# Patient Record
Sex: FEMALE | NOT HISPANIC OR LATINO | URBAN - NONMETROPOLITAN AREA
[De-identification: names, ages, dates, MRNs, and addresses within clinical notes are randomized per-mention and may not be internally consistent; named-entity substitution may affect disease eponyms.]

---

## 2018-02-23 ENCOUNTER — OFFICE VISIT CONVERTED (OUTPATIENT)
Dept: FAMILY MEDICINE CLINIC | Age: 73
End: 2018-02-23
Attending: FAMILY MEDICINE

## 2018-04-03 ENCOUNTER — OFFICE VISIT CONVERTED (OUTPATIENT)
Dept: FAMILY MEDICINE CLINIC | Age: 73
End: 2018-04-03
Attending: FAMILY MEDICINE

## 2018-09-07 ENCOUNTER — OFFICE VISIT CONVERTED (OUTPATIENT)
Dept: FAMILY MEDICINE CLINIC | Age: 73
End: 2018-09-07
Attending: NURSE PRACTITIONER

## 2018-09-07 ENCOUNTER — CONVERSION ENCOUNTER (OUTPATIENT)
Dept: OTHER | Facility: HOSPITAL | Age: 73
End: 2018-09-07

## 2018-09-14 ENCOUNTER — OFFICE VISIT CONVERTED (OUTPATIENT)
Dept: FAMILY MEDICINE CLINIC | Age: 73
End: 2018-09-14
Attending: NURSE PRACTITIONER

## 2018-11-02 ENCOUNTER — OFFICE VISIT CONVERTED (OUTPATIENT)
Dept: FAMILY MEDICINE CLINIC | Age: 73
End: 2018-11-02
Attending: NURSE PRACTITIONER

## 2019-01-23 ENCOUNTER — OFFICE VISIT CONVERTED (OUTPATIENT)
Dept: FAMILY MEDICINE CLINIC | Age: 74
End: 2019-01-23
Attending: NURSE PRACTITIONER

## 2019-08-26 ENCOUNTER — HOSPITAL ENCOUNTER (OUTPATIENT)
Dept: OTHER | Facility: HOSPITAL | Age: 74
Discharge: HOME | End: 2019-08-26
Attending: NURSE PRACTITIONER

## 2019-08-26 ENCOUNTER — OFFICE VISIT CONVERTED (OUTPATIENT)
Dept: FAMILY MEDICINE CLINIC | Age: 74
End: 2019-08-26
Attending: NURSE PRACTITIONER

## 2019-10-07 ENCOUNTER — HOSPITAL ENCOUNTER (OUTPATIENT)
Dept: OTHER | Facility: HOSPITAL | Age: 74
Discharge: HOME | End: 2019-10-07
Attending: FAMILY MEDICINE

## 2019-10-07 ENCOUNTER — OFFICE VISIT CONVERTED (OUTPATIENT)
Dept: FAMILY MEDICINE CLINIC | Age: 74
End: 2019-10-07
Attending: FAMILY MEDICINE

## 2019-10-07 LAB
ALBUMIN SERPL-MCNC: 4.2 G/DL (ref 3.5–5)
ALBUMIN/GLOB SERPL: 1.3 {RATIO} (ref 1.4–2.6)
ALP SERPL-CCNC: 61 U/L (ref 43–160)
ALT SERPL-CCNC: 17 U/L (ref 10–40)
ANION GAP SERPL CALC-SCNC: 21 MMOL/L (ref 8–19)
AST SERPL-CCNC: 32 U/L (ref 15–50)
BILIRUB SERPL-MCNC: 0.26 MG/DL (ref 0.2–1.3)
BUN SERPL-MCNC: 9 MG/DL (ref 5–25)
BUN/CREAT SERPL: 13 {RATIO} (ref 6–20)
CALCIUM SERPL-MCNC: 9.6 MG/DL (ref 8.7–10.4)
CHLORIDE SERPL-SCNC: 98 MMOL/L (ref 99–111)
CHOLEST SERPL-MCNC: 180 MG/DL (ref 107–200)
CHOLEST/HDLC SERPL: 3.4 {RATIO} (ref 3–6)
CONV CO2: 23 MMOL/L (ref 22–32)
CONV TOTAL PROTEIN: 7.4 G/DL (ref 6.3–8.2)
CREAT UR-MCNC: 0.68 MG/DL (ref 0.5–0.9)
GFR SERPLBLD BASED ON 1.73 SQ M-ARVRAT: >60 ML/MIN/{1.73_M2}
GLOBULIN UR ELPH-MCNC: 3.2 G/DL (ref 2–3.5)
GLUCOSE SERPL-MCNC: 103 MG/DL (ref 65–99)
HDLC SERPL-MCNC: 53 MG/DL (ref 40–60)
LDLC SERPL CALC-MCNC: 108 MG/DL (ref 70–100)
OSMOLALITY SERPL CALC.SUM OF ELEC: 285 MOSM/KG (ref 273–304)
POTASSIUM SERPL-SCNC: 4.1 MMOL/L (ref 3.5–5.3)
SODIUM SERPL-SCNC: 138 MMOL/L (ref 135–147)
TRIGL SERPL-MCNC: 93 MG/DL (ref 40–150)
VLDLC SERPL-MCNC: 19 MG/DL (ref 5–37)

## 2019-10-09 ENCOUNTER — HOSPITAL ENCOUNTER (OUTPATIENT)
Dept: OTHER | Facility: HOSPITAL | Age: 74
Discharge: HOME | End: 2019-10-09
Attending: FAMILY MEDICINE

## 2020-06-03 ENCOUNTER — OFFICE VISIT CONVERTED (OUTPATIENT)
Dept: FAMILY MEDICINE CLINIC | Age: 75
End: 2020-06-03
Attending: FAMILY MEDICINE

## 2020-06-03 ENCOUNTER — HOSPITAL ENCOUNTER (OUTPATIENT)
Dept: OTHER | Facility: HOSPITAL | Age: 75
Discharge: HOME | End: 2020-06-03
Attending: FAMILY MEDICINE

## 2020-06-03 LAB
ALBUMIN SERPL-MCNC: 4.4 G/DL (ref 3.5–5)
ALBUMIN/GLOB SERPL: 1.5 {RATIO} (ref 1.4–2.6)
ALP SERPL-CCNC: 54 U/L (ref 43–160)
ALT SERPL-CCNC: 16 U/L (ref 10–40)
ANION GAP SERPL CALC-SCNC: 20 MMOL/L (ref 8–19)
AST SERPL-CCNC: 27 U/L (ref 15–50)
BASOPHILS # BLD MANUAL: 0.05 10*3/UL (ref 0–0.2)
BASOPHILS NFR BLD MANUAL: 0.8 % (ref 0–3)
BILIRUB SERPL-MCNC: 0.33 MG/DL (ref 0.2–1.3)
BUN SERPL-MCNC: 16 MG/DL (ref 5–25)
BUN/CREAT SERPL: 24 {RATIO} (ref 6–20)
CALCIUM SERPL-MCNC: 9.8 MG/DL (ref 8.7–10.4)
CHLORIDE SERPL-SCNC: 105 MMOL/L (ref 99–111)
CONV CO2: 21 MMOL/L (ref 22–32)
CONV TOTAL PROTEIN: 7.3 G/DL (ref 6.3–8.2)
CREAT UR-MCNC: 0.67 MG/DL (ref 0.5–0.9)
DEPRECATED RDW RBC AUTO: 46.7 FL
EOSINOPHIL # BLD MANUAL: 0.28 10*3/UL (ref 0–0.7)
EOSINOPHIL NFR BLD MANUAL: 4.3 % (ref 0–7)
ERYTHROCYTE [DISTWIDTH] IN BLOOD BY AUTOMATED COUNT: 12.9 % (ref 11.5–14.5)
GFR SERPLBLD BASED ON 1.73 SQ M-ARVRAT: >60 ML/MIN/{1.73_M2}
GLOBULIN UR ELPH-MCNC: 2.9 G/DL (ref 2–3.5)
GLUCOSE SERPL-MCNC: 90 MG/DL (ref 65–99)
GRANS (ABSOLUTE): 3.75 10*3/UL (ref 2–8)
GRANS: 58.1 % (ref 30–85)
HBA1C MFR BLD: 12.3 G/DL (ref 12–16)
HCT VFR BLD AUTO: 37.6 % (ref 37–47)
IMM GRANULOCYTES # BLD: 0.01 10*3/UL (ref 0–0.54)
IMM GRANULOCYTES NFR BLD: 0.2 % (ref 0–0.43)
LIPASE SERPL-CCNC: 63 U/L (ref 5–51)
LYMPHOCYTES # BLD MANUAL: 1.87 10*3/UL (ref 1–5)
LYMPHOCYTES NFR BLD MANUAL: 7.7 % (ref 3–10)
MCH RBC QN AUTO: 31.7 PG (ref 27–31)
MCHC RBC AUTO-ENTMCNC: 32.7 G/DL (ref 33–37)
MCV RBC AUTO: 96.9 FL (ref 81–99)
MONOCYTES # BLD AUTO: 0.5 10*3/UL (ref 0.2–1.2)
OSMOLALITY SERPL CALC.SUM OF ELEC: 295 MOSM/KG (ref 273–304)
PLATELET # BLD AUTO: 282 10*3/UL (ref 130–400)
PMV BLD AUTO: 9.7 FL (ref 7.4–10.4)
POTASSIUM SERPL-SCNC: 4.3 MMOL/L (ref 3.5–5.3)
RBC # BLD AUTO: 3.88 10*6/UL (ref 4.2–5.4)
SODIUM SERPL-SCNC: 142 MMOL/L (ref 135–147)
VARIANT LYMPHS NFR BLD MANUAL: 28.9 % (ref 20–45)
WBC # BLD AUTO: 6.46 10*3/UL (ref 4.8–10.8)

## 2020-06-12 ENCOUNTER — OFFICE VISIT CONVERTED (OUTPATIENT)
Dept: FAMILY MEDICINE CLINIC | Age: 75
End: 2020-06-12
Attending: FAMILY MEDICINE

## 2020-06-25 ENCOUNTER — OFFICE VISIT CONVERTED (OUTPATIENT)
Dept: FAMILY MEDICINE CLINIC | Age: 75
End: 2020-06-25
Attending: FAMILY MEDICINE

## 2021-05-18 NOTE — PROGRESS NOTES
"Jelena Hawkins  1945     Office/Outpatient Visit    Visit Date: Wed, Darryn 3, 2020 10:19 am    Provider: Ambika Alicea MD (Assistant: Era Brownlee MA)    Location: [facility]        Electronically signed by Ambika Alicea MD on  06/03/2020 10:59:34 AM                             Subjective:        CC: Phoenix is a 74 year old White female.  Patient presents today to discuss GI issues X 6 weeks;         HPI: Phoenix is here today with chief complaint of stomach upset for the past 6 weeks.  She complains of pain in the epigastrium.  She has been trying Gas-Ex OTC.  That does help but she still gets a terrible amount of gas with anything she eats.  Pain starts in the epigastrium and then radiates \"out\" and straight through to the back.  Pain in the epigastrium is constant.  Eating does seem to worsen the pain.  She has been trying to eat small meals because of this.  No acid reflux as far as she can tell.  No diarrhea, sometimes having constipation.  No nausea or vomiting.  No melena or hematochezia.  Change in the stool caliber (smaller).    ROS:     CONSTITUTIONAL:  Negative for fatigue and fever.      EYES:  Negative for blurred vision.      CARDIOVASCULAR:  Negative for chest pain and palpitations.      RESPIRATORY:  Negative for recent cough and dyspnea.      GASTROINTESTINAL:  Positive for abdominal pain, abdominal bloating, constipation and change in stool caliber.   Negative for acid reflux symptoms, dysphagia, diarrhea, heartburn, hematochezia, melena, nausea or vomiting.      MUSCULOSKELETAL:  Negative for arthralgias and myalgias.          Past Medical History / Family History / Social History:         Last Reviewed on 6/03/2020 10:43 AM by Ambika Alicea    Past Medical History:             PREVENTIVE HEALTH MAINTENANCE             BONE DENSITY: was last done 10/9/2019 with the following abnormality noted-- osteopenia improved; on Evista     COLORECTAL CANCER SCREENING: Up to date " (colonoscopy q10y; sigmoidoscopy q5y; Cologuard q3y) was last done 10/15/2014, Results are in chart; colonoscopy with normal results     MAMMOGRAM: Done within last 2 years and results in are chart was last done 10/9/2019 with normal results h/o fibrocystic breast disease does not want anymore         PAST MEDICAL HISTORY         Positive for    Hypertension;     Positive for    Osteoarthritis;         CURRENT MEDICAL PROVIDERS:    Ophthalmologist: Dr. Cedeno    Orthopedist: Dr. 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          Substance Abuse History:     Last Reviewed on 6/03/2020 10:43 AM by Ambika Alicea        Mental Health History:     Last Reviewed on 6/03/2020 10:43 AM by Ambika Alicea        Communicable Diseases (eg STDs):     Last Reviewed on 6/03/2020 10:43 AM by Ambika Alicea        Current Problems:     Last Reviewed on 10/07/2019 02:35 PM by Ambika Alicea    Osteoporosis, other    Essential (primary) hypertension    Other osteoporosis without current pathological fracture    Hypertension    Lower back pain    Other symptoms and signs involving the musculoskeletal system    Strain of muscle, fascia and tendon of lower back, subsequent encounter    Shoulder crepitus        Immunizations:     Fluzone vs. High Dose Fluzone 10/1/2016    Td adult 10/20/2018    Prevnar 13 (Pneumococcal PCV 13) 1/27/2016    Fluzone High-Dose pf (>=65 yr) 12/1/2017    Fluzone High-Dose pf (>=65 yr) 10/7/2019    PNEUMOVAX 23 (Pneumococcal  PPV23) 1/23/2019        Allergies:     Last Reviewed on 10/07/2019 02:35 PM by Ambika Alicea    No Known Allergies.        Current Medications:     Last Reviewed on 10/07/2019 02:35 PM by Ambika Alicea    Amlodipine  5 mg oral tablet [1 tab daily]    raloxifene 60 mg oral tablet [1 tab daily]    Valsartan 160 mg oral tablet [Take 1 tablet(s) by mouth daily]    Multivitamin/Mineral Supplement         Objective:        Vitals:         Current: 6/3/2020 10:21:55 AM    Ht:  5 ft, 2 in;  Wt: 136.4 lbs;  BMI: 24.9T: 97.8 F (oral);  BP: 124/71 mm Hg (right arm, sitting);  P: 94 bpm (right arm (BP Cuff), sitting);  sCr: 0.68 mg/dL;  GFR: 66.12        Exams:     PHYSICAL EXAM:     GENERAL: vital signs recorded - well developed, well nourished;  well groomed;  no apparent distress;     EYES: extraocular movements intact; conjunctiva and cornea are normal; PERRLA;     E/N/T: OROPHARYNX:  normal mucosa, dentition, gingiva, and posterior pharynx;     RESPIRATORY: normal respiratory rate and pattern with no distress; normal breath sounds with no rales, rhonchi, wheezes or rubs;     CARDIOVASCULAR: normal rate; rhythm is regular;  no systolic murmur; no edema;     GASTROINTESTINAL: moderate epigastric pain;  voluntary guarding;  no rebound tenderness;  normal bowel sounds;     MUSCULOSKELETAL: normal gait; normal overall tone         Assessment:         R10.816   Epigastric abdominal tenderness       Z13.31   Encounter for screening for depression           ORDERS:         Radiology/Test Orders:       3017F  Colorectal CA screen results documented and reviewed (PV)  (In-House)              Lab Orders:       [id number]  BDCBC - [facility] CBC with 3 part diff  (Send-Out)            54808  COMP - [facility] Comp. Metabolic Panel  (Send-Out)            41162  HPUBT - [facility] H.pylori Breath test  (Send-Out)            95996  LIP - [facility] Lipase, Serum  (Send-Out)              Other Orders:       1100F  Pt screen for fall risk; document 2+ falls in the  "past yr or any fall w/injury in past year (ISABEL)  (In-House)              Screening mammogram results documented  (Send-Out)              Depression screen negative  (In-House)                      Plan:         Epigastric abdominal tendernessModerate epigastric TTP with increased belching and gas.  Possible H. pylori vs other gastritis or possible gallbladder pathology.  Checking labs as below, also to include lipase to evaluate pancreas.  Will contact her with results when these are available.    LABORATORY:  Labs ordered to be performed today include CBC, Comprehensive metabolic panel, H.pylori urea breath test ([facility]), and Lipase.  [facility] PHQ-9 Depression Screening: Completed form scanned and in chart; Total Score 11; Positive Depression Screen but after further evaluation the patient does not have a diagnosis of depression.  \"I'm not really depressed.  It's just this coronavirus.\"    FOLLOW-UP: Keep currently scheduled appointment.:.  f/u AWV in Oct          Orders:       01645  BDCBC - [facility] CBC with 3 part diff  (Send-Out)            60518  COMP - [facility] Comp. Metabolic Panel  (Send-Out)            13195  HPUBT - [facility] H.pylori Breath test  (Send-Out)            86418  LIP - [facility] Lipase, Serum  (Send-Out)              Depression screen negative  (In-House)              Encounter for screening for depression    MIPS Has fallen 2+ times in the past year or had one fall with an injury in the past year Vaccines Flu and Pneumonia updated in Shot record Screening mammomgram done within last 2 years and results in are chart Colorectal Cancer Screening is up to date and the results are in the chart           Orders:       1100F  Pt screen for fall risk; document 2+ falls in the past yr or any fall w/injury in past year (ISABEL)  (In-House)              Screening mammogram results documented  (Send-Out)            3017F  Colorectal CA screen results documented and reviewed (PV)  (In-House)                  Patient " Recommendations:        For  Epigastric abdominal tenderness:                    APPOINTMENT INFORMATION:        Monday Tuesday Wednesday Thursday Friday Saturday Sunday            Time:___________________AM  PM   Date:_____________________             Charge Capture:         Primary Diagnosis:     R10.816  Epigastric abdominal tenderness           Orders:      86046  Office/outpatient visit; established patient, level 3  (In-House)              Depression screen negative  (In-House)              Z13.31  Encounter for screening for depression           Orders:      1100F  Pt screen for fall risk; document 2+ falls in the past yr or any fall w/injury in past year (ISABEL)  (In-House)            3017F  Colorectal CA screen results documented and reviewed (PV)  (In-House)

## 2021-05-18 NOTE — PROGRESS NOTES
Jelena Hawkins 1945     Office/Outpatient Visit    Visit Date: Mon, Aug 26, 2019 01:24 pm    Provider: Kiya Oliva N.P. (Assistant: Sarah Spurling, MA)    Location: [facility]        Electronically signed by Kiya Oilva N.P. on  08/26/2019 02:35:34 PM                             SUBJECTIVE:        CC:     Phoenix is a 73 year old White female.  Sciatica;         HPI:         Patient to be evaluated for lower back pain.  The location is primarily in the lower, right lumbar spine.  The pain radiates to the right calf.  She characterizes it as intermittent.  This is a chronic, but intermittent problem with an acute exacerbation.  She states that the current episode of pain started one month ago.  The event which precipitated this pain was was in the 5K for [employer] in June, has been flared up ever since.          In regard to the foot pain, today's visit is for evaluation of the right foot.  The location of the discomfort is primarily the dorsal surface.  It radiates to the arch and toes.  The pain initially began several months ago.  The precipitating event was but she did catcher her great to on the door she was opening and ripped her toenail off.  She characterizes the pain as intermittent, aching, and burning.  Associated symptoms include sligh swelling over dorsal right foot.  The pain increases with at the end of the day.      ROS:     CONSTITUTIONAL:  Negative for chills, fatigue, fever, and weight change.      CARDIOVASCULAR:  Negative for chest pain, orthopnea, paroxysmal nocturnal dyspnea and pedal edema.      RESPIRATORY:  Negative for dyspnea.      GENITOURINARY:  Negative for dysuria and frequent urination.      MUSCULOSKELETAL:  Positive for arthralgias, (right foot) back pain ( recurrent ) and limb pain ( right leg pain ).      NEUROLOGICAL:  Negative for paresthesias.          PMH/FMH/SH:     Last Reviewed on 9/07/2018 11:23 AM by Kiya Oliva    Past Medical History:              PREVENTIVE HEALTH MAINTENANCE             BONE DENSITY: was last done 4/2016 with the following abnormality noted-- osteopenia     COLORECTAL CANCER SCREENING: colonoscopy with normal results     MAMMOGRAM: was last done 5/5/17 fibrocystic breast disease does not want anymore         PAST MEDICAL HISTORY         Positive for    Hypertension;     Positive for    Osteoarthritis;         CURRENT MEDICAL PROVIDERS:    Ophthalmologist: Dr. Cedeno    Orthopedist: Dr. 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          Substance Abuse History:     Last Reviewed on 4/03/2018 03:51 PM by Ambika Alicea        Mental Health History:     Last Reviewed on 4/03/2018 03:51 PM by Ambika Alicea        Communicable Diseases (eg STDs):     Last Reviewed on 4/03/2018 03:51 PM by Ambika Alicea            Current Problems:     Last Reviewed on 11/02/2018 02:14 PM by Kiya Oliva    Lower back pain     Osteoporosis, other     Hypertension     Foot pain     Shoulder crepitus         Immunizations:     Fluzone vs. High Dose Fluzone 10/1/2016     Td adult 10/20/2018     Prevnar 13 (Pneumococcal PCV 13) 1/27/2016     Fluzone High-Dose pf (>=65 yr) 12/1/2017     PNEUMOVAX 23 (Pneumococcal PPV23) 1/23/2019         Allergies:     Last Reviewed on 8/26/2019 01:28 PM by Spurling, Sarah C      No Known Drug Allergies.         Current Medications:     Last Reviewed on 8/26/2019 01:29 PM by Spurling, Sarah C    Raloxifene 60mg Tablet 1 tab daily      Amlodipine  5mg Tablet 1 tab daily     Valsartan 160mg Tablet Take 1 tablet(s) by mouth daily         OBJECTIVE:        Vitals:         Current: 8/26/2019 1:30:45 PM    Ht:  5 ft, 2 in;  Wt: 162.2 lbs;  BMI: 29.7    T: 98.1 F (oral);  BP: 126/64 mm Hg (right arm, sitting);  P: 74 bpm (right arm (BP Cuff), sitting);  sCr: 0.82 mg/dL;  GFR: 59.91        Exams:     PHYSICAL EXAM:     GENERAL: vital signs recorded - well developed, well nourished;  no apparent distress;     NECK: range of motion is normal; thyroid is non-palpable;     RESPIRATORY: normal respiratory rate and pattern with no distress; normal breath sounds with no rales, rhonchi, wheezes or rubs;     CARDIOVASCULAR: normal rate; rhythm is regular;  no systolic murmur; no edema;     MUSCULOSKELETAL: gait: affected by a right leg limp;  normal range of motion of all major muscle groups; pain with range of motion in: back extension;  right ankle extension and plantar flexion;     NEUROLOGICAL:  cranial nerves, motor and sensory function, reflexes, gait and coordination are all intact;     PSYCHIATRIC:  appropriate affect and demeanor; normal speech pattern; grossly normal memory;         ASSESSMENT:           724.2   S39.012D  Lower back pain              DDx:     729.5   M79.671  Foot pain              DDx:         ORDERS:         Radiology/Test Orders:       [id number]  Radiologic examination, spine, lumbosacral;  minimum of four views  (Send-Out)         97346XA  Right radiologic examination, foot; complete, minimum of three views  (Send-Out)                   PLAN:          Lower back pain will get xray - she stated that her chiropractor would like for her to have this done and let him know if acute finding.  She prema resume the diclofenac at home and follow up here if appropriat - she is also scheduled for a therapeutic massage soon         RADIOLOGY:  I have ordered Lumbar/Sacral Spine X-ray to be done today.            Orders:       [id number]  Radiologic  examination, spine, lumbosacral;  minimum of four views  (Send-Out)            Foot pain may be related to her toenail issue, but will xray and have her follow pPRN if no improvement after NSAIDs and rest         RADIOLOGY:  I have ordered a right foot x-ray to be done today.            Orders:       [id number]  Right radiologic examination, foot; complete, minimum of three views  (Send-Out)               CHARGE CAPTURE:           Primary Diagnosis:     724.2 Lower back pain            S39.012D    Strain of muscle, fascia and tendon of lower back, subsequent encounter              Orders:          64057   Office/outpatient visit; established patient, level 3  (In-House)           729.5 Foot pain            M79.671    Pain in right foot

## 2021-05-18 NOTE — PROGRESS NOTES
Jelena Hawkins 1945     Office/Outpatient Visit    Visit Date: Fri, Nov 2, 2018 01:18 pm    Provider: Kiya Oliva N.P. (Assistant: Sarah Spurling, MA)    Location: [facility]        Electronically signed by Kiya Oliva N.P. on  11/02/2018 02:17:09 PM                             SUBJECTIVE:        CC:     Phoenix is a 72 year old White female.  Left ring finger, she cut it.;         HPI:         Finger laceration noted.  Phoenix presents with a laceration of the left 4th finger.  Evidence of possible infection at the site include increased pain (other than expected from the injury), but not redness, drainage, injury site warmth or fever.  The injury is due to a feel and broke plastic flower pot broke and she cut her finger on the plastic.  This occurred 2 weeks ago.  She has already bandaged the area and went to urgent care, antibiotic- tetanus shot -.  I here for follow up to have her finger checked out     ROS:     CONSTITUTIONAL:  Negative for chills, fatigue and fever.      CARDIOVASCULAR:  Negative for chest pain and pedal edema.      RESPIRATORY:  Negative for recent cough and dyspnea.      INTEGUMENTARY/BREAST:  Positive for laceration to left 4th finger.   Negative for pruritis or rash.      NEUROLOGICAL:  Negative for paresthesias.          PM/FM/SH:     Last Reviewed on 9/07/2018 11:23 AM by Kiya Oliva    Past Medical History:             PREVENTIVE HEALTH MAINTENANCE             BONE DENSITY: was last done 4/2016 with the following abnormality noted-- osteopenia     COLONOSCOPY: with normal results     INFLUENZA VACCINE: was last done DECLINES     MAMMOGRAM: was last done 5/5/17 fibrocystic breast disease does not want anymore     Prevnar 13: was last done 1/2016     PNEUMOCOCCAL 23 VACCINE: was last done 2010         PAST MEDICAL HISTORY         Positive for    Hypertension;     Positive for    Osteoarthritis;         CURRENT MEDICAL PROVIDERS:    Ophthalmologist:   Pao    Orthopedist: Dr. 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          Substance Abuse History:     Last Reviewed on 4/03/2018 03:51 PM by Ambika Alicea        Mental Health History:     Last Reviewed on 4/03/2018 03:51 PM by Ambika Alicea        Communicable Diseases (eg STDs):     Last Reviewed on 4/03/2018 03:51 PM by Ambika Alicea            Current Problems:     Last Reviewed on 11/02/2018 02:14 PM by Kiya Oliva    Lower back pain     Osteoporosis, other     Hypertension     Finger laceration     Screening mammogram - other     Shoulder crepitus         Immunizations:     Fluzone vs. High Dose Fluzone 10/1/2016     Td adult 10/20/2018     Prevnar 13 (Pneumococcal PCV 13) 1/27/2016     Fluzone High-Dose pf (>=65 yr) 12/1/2017         Allergies:     Last Reviewed on 11/02/2018 01:18 PM by Spurling, Sarah C      No Known Drug Allergies.         Current Medications:     Last Reviewed on 11/02/2018 01:21 PM by Spurling, Sarah C    Amlodipine  5mg Tablet 1 tab daily     Losartan 100mg Tablet Take 1 tablet(s) by mouth daily     Raloxifene 60mg Tablet 1 tab daily     Diclofenac Sodium 75mg Tablets, Enteric Coated 1 tab bid with food         OBJECTIVE:        Vitals:         Current: 11/2/2018 1:22:39 PM    Ht:  5 ft, 2 in;  Wt: 158.2 lbs;  BMI: 28.9    T: 98.3 F (oral);  BP: 152/76 mm Hg (right arm, sitting);  P: 86 bpm (right arm (BP Cuff), sitting);  sCr: 0.82 mg/dL;  GFR: 60.15        Exams:     PHYSICAL EXAM:      GENERAL: vital signs recorded - well developed, well nourished;  no apparent distress;     NECK: range of motion is normal;     RESPIRATORY: normal appearance and symmetric expansion of chest wall; normal respiratory rate and pattern with no distress; normal breath sounds with no rales, rhonchi, wheezes or rubs;     CARDIOVASCULAR: normal rate; rhythm is regular;  no edema;     LYMPHATIC: no enlargement of cervical or facial nodes; no supraclavicular nodes;     BREAST/INTEGUMENT: healing area to the left 4th finger  lateral upper digit;  some areas noted with minimal slough - ;  no redness, no warmth, no active drainage;     MUSCULOSKELETAL: normal gait; normal range of motion of all major muscle groups; no limb or joint pain with range of motion;     NEUROLOGIC: mental status: alert and oriented x 3;     PSYCHIATRIC: appropriate affect and demeanor; normal speech pattern; normal thought and perception;         ASSESSMENT           883.0   S61.215D  Finger laceration              DDx:         PLAN:          Finger laceration cleaned area today with saline and applied Triple antibiotic ointment         RECOMMENDATIONS given include: recommend leaving area open to air much of the time when at home - protect when possibility of contamination/ further injury.            Patient Education Handouts:       [facility] Medication Compliance              Patient Recommendations:        For  Finger laceration:     I also recommend recommend leaving area open to air much of the time when at home - protect when possibility of contamination/ further injury.              CHARGE CAPTURE           **Please note: ICD descriptions below are intended for billing purposes only and may not represent clinical diagnoses**        Primary Diagnosis:         883.0 Finger laceration            S61.215D    Laceration without foreign body of left ring finger without damage to nail, subsequent encounter              Orders:          [id number]    Office/outpatient visit; established patient, level 3  (In-House)

## 2021-05-18 NOTE — PROGRESS NOTES
Jelena Hawkins 1945     Office/Outpatient Visit    Visit Date: Fri, Sep 14, 2018 01:55 pm    Provider: Kiya Oliva N.P. (Assistant: Sarah Spurling, MA)    Location: [facility]        Electronically signed by Kiya Oliva N.P. on  09/16/2018 10:20:24 PM                             SUBJECTIVE:        CC:     Phoenix is a 72 year old White female.  This is a follow-up visit.  Not any better, she is still down in her back, ciatic nerve pain.;         HPI:         Patient to be evaluated for lower back pain.  Reason for visit: Pain.  This is a follow-up visit. Her symptoms are unchanged since last visit.  The discomfort is most prominent in the lumbar spine.  This radiates to the right buttock.  and in bilateral groin She states that the current episode of pain started 2 weeks ago.  The event which precipitated this pain was was getting out of bed and put feet on floor - pulled or strained.  Other details: was put on medrol dose pack last week - reports today no better  did help in the beginning - but now today is worse with the pain radiating down into the groin.      ROS:     CONSTITUTIONAL:  Negative for chills, fatigue and fever.      CARDIOVASCULAR:  Negative for chest pain and pedal edema.      RESPIRATORY:  Negative for recent cough and dyspnea.      MUSCULOSKELETAL:  Positive for back pain.      NEUROLOGICAL:  Negative for paresthesias.          PMH/FMH/SH:     Last Reviewed on 9/07/2018 11:23 AM by Kiya Oliva    Past Medical History:             PREVENTIVE HEALTH MAINTENANCE             BONE DENSITY: was last done 4/2016 with the following abnormality noted-- osteopenia     COLONOSCOPY: with normal results     INFLUENZA VACCINE: was last done DECLINES     MAMMOGRAM: was last done 5/5/17 fibrocystic breast disease does not want anymore     Prevnar 13: was last done 1/2016     PNEUMOCOCCAL 23 VACCINE: was last done 2010         PAST MEDICAL HISTORY         Positive for     Hypertension;     Positive for    Osteoarthritis;         CURRENT MEDICAL PROVIDERS:    Ophthalmologist: Dr. Cedeno    Orthopedist: Dr. 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          Substance Abuse History:     Last Reviewed on 4/03/2018 03:51 PM by Ambika Alicea        Mental Health History:     Last Reviewed on 4/03/2018 03:51 PM by Ambika Alicea        Communicable Diseases (eg STDs):     Last Reviewed on 4/03/2018 03:51 PM by Ambika Alicea            Current Problems:     Last Reviewed on 9/07/2018 11:22 AM by Kiya Oliva    Lower back pain     Osteoporosis, other     Hypertension     Screening mammogram - other     Shoulder crepitus         Immunizations:     Fluzone vs. High Dose Fluzone 10/1/2016     Prevnar 13 (Pneumococcal PCV 13) 1/27/2016     Fluzone High-Dose pf (>=65 yr) 12/1/2017         Allergies:     Last Reviewed on 9/14/2018 01:55 PM by Spurling, Sarah C      No Known Drug Allergies.         Current Medications:     Last Reviewed on 9/14/2018 02:00 PM by Spurling, Sarah C    Amlodipine  5mg Tablet 1 tab daily     Losartan 100mg Tablet Take 1 tablet(s) by mouth daily     Raloxifene 60mg Tablet 1 tab daily         OBJECTIVE:        Vitals:         Current: 9/14/2018 2:02:20 PM    Ht:  5 ft, 2 in;  Wt: 152.4 lbs;  BMI: 27.9    T: 98.22 F (oral);  BP: 112/59 mm Hg (right arm, sitting);  P: 91 bpm (right arm (BP Cuff), sitting);  sCr: 0.82 mg/dL;  GFR: 59.21        Exams:     PHYSICAL EXAM:     GENERAL: vital  signs recorded - well developed, well nourished;  no apparent distress;     NECK: range of motion is normal;     RESPIRATORY: normal appearance and symmetric expansion of chest wall; normal respiratory rate and pattern with no distress; normal breath sounds with no rales, rhonchi, wheezes or rubs;     CARDIOVASCULAR: normal rate; rhythm is regular;  no edema;     LYMPHATIC: no enlargement of cervical or facial nodes; no supraclavicular nodes;     MUSCULOSKELETAL: gait: affected by a limp, slowed, and stooped;  normal range of motion of all major muscle groups; pain with range of motion in: back flexion, extension, and lateral flexion;     NEUROLOGIC: mental status: alert and oriented x 3;     PSYCHIATRIC: appropriate affect and demeanor; normal speech pattern; normal thought and perception;         Procedures:     Lower back pain     1. Toradol 60 mg given IM in the left hip; administered by scs;  lot number [id number]; expires 02/20             ASSESSMENT           724.2   S39.012D  Lower back pain              DDx:         ORDERS:         Meds Prescribed:       Diclofenac Sodium 75mg Tablets, Enteric Coated 1 tab bid with food  #30 (Thirty) tablet(s) Refills: 2       Tizanidine HCl 2mg Tablet 1 tab PO daily prn back pain  #20 (Twenty) tablet(s) Refills: 0         Lab Orders:       [id number]  Urinalysis, automated, without microscopy  (In-House)           Procedures Ordered:       REFER  Referral to Specialist or Other Facility  (Send-Out)           Other Orders:       [id number]  Therapeutic injection  (In-House)           Toradol, per 15 mg (x4)                 PLAN:          Lower back pain     LABORATORY:  Labs ordered to be performed today include UA dip in office no micro.      REFERRALS:  Referral initiated to physical therapy ( [facility] ).  Narcotic or pain medication Toradol 60 mg           Prescriptions:       Diclofenac Sodium 75mg Tablets, Enteric Coated 1 tab bid with food  #30  (Thirty) tablet(s) Refills: 2       Tizanidine HCl 2mg Tablet 1 tab PO daily prn back pain  #20 (Twenty) tablet(s) Refills: 0           Orders:       [id number]  Urinalysis, automated, without microscopy  (In-House)         10122  Therapeutic injection  (In-House)                     Toradol, per 15 mg (x4)       REFER  Referral to Specialist or Other Facility  (Send-Out)               CHARGE CAPTURE           **Please note: ICD descriptions below are intended for billing purposes only and may not represent clinical diagnoses**        Primary Diagnosis:         724.2 Lower back pain            S39.012D    Strain of muscle, fascia and tendon of lower back, subsequent encounter              Orders:          [id number]   Office/outpatient visit; established patient, level 3  (In-House)             72837   Urinalysis, automated, without microscopy  (In-House)             97459   Therapeutic injection  (In-House)                                           Toradol, per 15 mg (x4)

## 2021-05-18 NOTE — PROGRESS NOTES
Jelena Hawkins 1945     Office/Outpatient Visit    Visit Date: Fri, Sep 7, 2018 11:05 am    Provider: Kiya Oliva N.P. (Assistant: Sarah Spurling, MA)    Location: [facility]        Electronically signed by Kiya Oliva N.P. on  09/11/2018 10:46:47 AM                             SUBJECTIVE:        CC:     Phoenix is a 72 year old White female.  Pinched nerve, right disk, radiating pain down leg.;         HPI:     Went to Chiropractor today and was told there was something going on L4/L5 - was 'using a machine on back' and when hit that area, twinge in her back and record     Lower back pain noted.  Reason for visit: Pain.  The discomfort is most prominent in the lumbar spine.  She characterizes it as moderate in intensity.  The pain level between 1 and 10 is a 3.  This is an acute episode with no prior history of back pain.  She states that the current episode of pain started one week ago.  The event which precipitated this pain was woke up and ws putting her foot to the floor and sent an excruciating pain down right leg.  This occurred at home.  She has not found anything that helps relieve the pain.      ROS:     CONSTITUTIONAL:  Negative for chills, fatigue and fever.      CARDIOVASCULAR:  Negative for chest pain and pedal edema.      RESPIRATORY:  Negative for recent cough and dyspnea.      GENITOURINARY:  Negative for dysuria, urinary incontinence and change in urine stream.      MUSCULOSKELETAL:  Positive for arthralgias, back pain and myalgias.      NEUROLOGICAL:  Negative for paresthesias.          PMH/FMH/SH:     Last Reviewed on 9/07/2018 11:23 AM by Kiya Oliva    Past Medical History:             PREVENTIVE HEALTH MAINTENANCE             BONE DENSITY: was last done 4/2016 with the following abnormality noted-- osteopenia     COLONOSCOPY: with normal results     INFLUENZA VACCINE: was last done DECLINES     MAMMOGRAM: was last done 5/5/17 fibrocystic breast disease does  not want anymore     Prevnar 13: was last done 1/2016     PNEUMOCOCCAL 23 VACCINE: was last done 2010         PAST MEDICAL HISTORY         Positive for    Hypertension;     Positive for    Osteoarthritis;         CURRENT MEDICAL PROVIDERS:    Ophthalmologist: Dr. Cedeno    Orthopedist: Dr. Blount         Surgical History:         Cataract Removal: bilateral;     Hysterectomy: remotely; d/t fibroid;     Joint Replacement: partial L knee - 8/2014;      ORIF L humerus repair;         Family History:         Sister(s): Breast Cancer     Maternal Grandmother: Type 2 Diabetes         Social History:     Occupation:    Retired     Marital Status: associate (former Sister of Sondra)         Tobacco/Alcohol/Supplements:     Last Reviewed on 9/07/2018 11:10 AM by Spurling, Sarah C    Tobacco: She has never smoked.          Substance Abuse History:     Last Reviewed on 4/03/2018 03:51 PM by Ambika Alicea        Mental Health History:     Last Reviewed on 4/03/2018 03:51 PM by Ambika Alicea        Communicable Diseases (eg STDs):     Last Reviewed on 4/03/2018 03:51 PM by Ambika Alicea            Current Problems:     Last Reviewed on 9/07/2018 11:22 AM by Kiya Oliva    Lower back pain     Osteoporosis, other     Hypertension     Screening mammogram - other     Nonspecific abnormality on liver function study     Shoulder crepitus         Immunizations:     Fluzone vs. High Dose Fluzone 10/1/2016     Prevnar 13 (Pneumococcal PCV 13) 1/27/2016     Fluzone High-Dose pf (>=65 yr) 12/1/2017         Allergies:     Last Reviewed on 9/07/2018 11:10 AM by Spurling, Sarah C      No Known Drug Allergies.         Current Medications:     Last Reviewed on 9/07/2018 11:11 AM by Spurling, Sarah C    Amlodipine  5mg Tablet 1 tab daily     Losartan 100mg Tablet Take 1 tablet(s) by mouth daily     Raloxifene 60mg Tablet 1 tab daily         OBJECTIVE:        Vitals:         Current: 9/7/2018 11:12:41 AM    Ht:  5 ft, 2 in;  Wt:  152.2 lbs;  BMI: 27.8    T: 98.5 F (oral);  BP: 118/81 mm Hg (right arm, sitting);  P: 74 bpm (right arm (BP Cuff), sitting);  sCr: 0.82 mg/dL;  GFR: 59.17        Exams:     PHYSICAL EXAM:     GENERAL: vital signs recorded - well developed, well nourished;  no apparent distress;     NECK: range of motion is normal;     RESPIRATORY: normal appearance and symmetric expansion of chest wall; normal respiratory rate and pattern with no distress; normal breath sounds with no rales, rhonchi, wheezes or rubs;     CARDIOVASCULAR: normal rate; rhythm is regular;  no edema;     LYMPHATIC: no enlargement of cervical or facial nodes; no supraclavicular nodes;     MUSCULOSKELETAL: normal gait; normal range of motion of all major muscle groups; pain with range of motion in: back extension and lateral flexion;     NEUROLOGIC: mental status: alert and oriented x 3;     PSYCHIATRIC: appropriate affect and demeanor; normal speech pattern; normal thought and perception;         ASSESSMENT           724.2   S39.012A  Lower back pain              DDx:     794.8   R94.5  Nonspecific abnormality on liver function study              DDx:     V76.12   Z12.31  Screening mammogram - other              DDx:         ORDERS:         Meds Prescribed:       Medrol (Methylprednisolone) 4mg Dosepak Take as directed with food  #1 (One) dose pack Refills: 0         Radiology/Test Orders:       [id number]  Radiologic examination, spine, lumbosacral;  minimum of four views  (Send-Out)         46131  Screening mammography, bilateral (2-view film study of each breast)  (Send-Out)                   PLAN:          Lower back pain         RADIOLOGY:  I have ordered Lumbar/Sacral Spine X-ray to be done today.            Prescriptions:       Medrol (Methylprednisolone) 4mg Dosepak Take as directed with food  #1 (One) dose pack Refills: 0           Orders:       [id number]  Radiologic examination, spine, lumbosacral;  minimum of four views  (Send-Out)             Nonspecific abnormality on liver function study obtain labs as previously requested          Screening mammogram - other         FOLLOW-UP TESTING #1:    RADIOLOGY:  I have ordered screening mammogram to be done today.            Orders:       [id number]  Screening mammography, bilateral (2-view film study of each breast)  (Send-Out)               CHARGE CAPTURE           **Please note: ICD descriptions below are intended for billing purposes only and may not represent clinical diagnoses**        Primary Diagnosis:         724.2 Lower back pain            S39.012A    Strain of muscle, fascia and tendon of lower back, initial encounter              Orders:          [id number]   Office/outpatient visit; established patient, level 3  (In-House)           794.8 Nonspecific abnormality on liver function study            R94.5    Abnormal results of liver function studies    V76.12 Screening mammogram - other            Z12.31    Encounter for screening mammogram for malignant neoplasm of breast

## 2021-05-18 NOTE — PROGRESS NOTES
Jelena Hawkins 1945     Office/Outpatient Visit    Visit Date: Wed, Jan 23, 2019 02:14 pm    Provider: Kiya Oliva N.P. (Assistant: Sarah Spurling, MA)    Location: [facility]        Electronically signed by Kiya Oliva N.P. on  01/27/2019 10:29:55 PM                             SUBJECTIVE:        CC:     Phoenix is a 73 year old White female.  Med refills.;         HPI:         Phoenix presents with osteoporosis, other.  This has been a problem for the past 5 years.  Tests done to help confirm the diagnosis and evaluate for treatable causes include bone densinometry test.  Current treatment includes Raloxifene.  Last Dexa 4/2016 - due now she would like an alternative medication due to insurance change - will be $35/month and would like to change         Concerning hypertension, she did not bring her blood pressure diary, but says that pressures have been okay.  She is tolerating the medication well without side effects.  Compliance with treatment has been good.  GOT A LETTER FROM RUPESH REGARDING LOSARTAN - NEEDS ALTERNATIVE     ROS:     CONSTITUTIONAL:  Negative for chills, fatigue and fever.      CARDIOVASCULAR:  Negative for chest pain and pedal edema.      RESPIRATORY:  Negative for recent cough and dyspnea.      GASTROINTESTINAL:  Negative for abdominal pain, constipation, diarrhea, heartburn, nausea and vomiting.      MUSCULOSKELETAL:  Negative for arthralgias, back pain and myalgias.          PMH/FMH/SH:     Last Reviewed on 9/07/2018 11:23 AM by Kiya Oliva    Past Medical History:             PREVENTIVE HEALTH MAINTENANCE             BONE DENSITY: was last done 4/2016 with the following abnormality noted-- osteopenia     COLORECTAL CANCER SCREENING: colonoscopy with normal results     MAMMOGRAM: was last done 5/5/17 fibrocystic breast disease does not want anymore         PAST MEDICAL HISTORY         Positive for    Hypertension;     Positive for    Osteoarthritis;          CURRENT MEDICAL PROVIDERS:    Ophthalmologist: Dr. Cedeno    Orthopedist: Dr. Blount         Surgical History:         Cataract Removal: bilateral;     Hysterectomy: remotely; d/t fibroid;     Joint Replacement: partial L knee - 8/2014;      ORIF L humerus repair;         Family History:         Sister(s): Breast Cancer     Maternal Grandmother: Type 2 Diabetes         Social History:     Occupation:    Retired     Marital Status: associate (former Sister of Sondra)         Tobacco/Alcohol/Supplements:     Last Reviewed on 1/23/2019 02:15 PM by Spurling, Sarah C    Tobacco: She has never smoked.          Substance Abuse History:     Last Reviewed on 4/03/2018 03:51 PM by Ambika Alicea        Mental Health History:     Last Reviewed on 4/03/2018 03:51 PM by Ambika Alicea        Communicable Diseases (eg STDs):     Last Reviewed on 4/03/2018 03:51 PM by Ambika Alicea            Current Problems:     Last Reviewed on 11/02/2018 02:14 PM by Kiya Oliva    Lower back pain     Osteoporosis, other     Hypertension     Shoulder crepitus         Immunizations:     Fluzone vs. High Dose Fluzone 10/1/2016     Td adult 10/20/2018     Prevnar 13 (Pneumococcal PCV 13) 1/27/2016     Fluzone High-Dose pf (>=65 yr) 12/1/2017     PNEUMOVAX 23 (Pneumococcal PPV23) 1/23/2019         Allergies:     Last Reviewed on 1/23/2019 02:14 PM by Spurling, Sarah C      No Known Drug Allergies.         Current Medications:     Last Reviewed on 1/23/2019 02:21 PM by Spurling, Sarah C    Amlodipine  5mg Tablet 1 tab daily     Raloxifene 60mg Tablet 1 tab daily         OBJECTIVE:        Vitals:         Current: 1/23/2019 2:23:15 PM    Ht:  5 ft, 2 in;  Wt: 162.4 lbs;  BMI: 29.7    T: 98.4 F (oral);  BP: 132/73 mm Hg (right arm, sitting);  P: 76 bpm (right arm (BP Cuff), sitting);  sCr: 0.82 mg/dL;  GFR: 59.94        Exams:     PHYSICAL EXAM:     GENERAL: vital signs recorded - well developed, well nourished;  no apparent distress;      NECK: range of motion is normal;     RESPIRATORY: normal appearance and symmetric expansion of chest wall; normal respiratory rate and pattern with no distress; normal breath sounds with no rales, rhonchi, wheezes or rubs;     CARDIOVASCULAR: normal rate; rhythm is regular;  no edema;     LYMPHATIC: no enlargement of cervical or facial nodes; no supraclavicular nodes;     MUSCULOSKELETAL: normal gait; normal range of motion of all major muscle groups; no limb or joint pain with range of motion;     NEUROLOGIC: mental status: alert and oriented x 3;     PSYCHIATRIC: appropriate affect and demeanor; normal speech pattern; normal thought and perception;         Procedures:     Vaccination against pneumococcal pneumonia     1. Pneumovax (pneumococcal PPSV23): 0.5 ml unit dose given IM in the right upper arm; administered by AS;  lot number [id number]; expires 04/16/2020             ASSESSMENT           733.09   M81.8  Osteoporosis, other              DDx:     401.1   I10  Hypertension              DDx:     V03.82   Z23  Vaccination against pneumococcal pneumonia              DDx:         ORDERS:         Meds Prescribed:       Refill of: Amlodipine  5mg Tablet 1 tab daily  #90 (Ninety) tablet(s) Refills: 1       Valsartan 160mg Tablet Take 1 tablet(s) by mouth daily  #90 (Ninety) tablet(s) Refills: 1       Refill of: Raloxifene 60mg Tablet 1 tab daily  #90 (Ninety) tablet(s) Refills: 3         Procedures Ordered:       [id number]  Pneumococcal polysaccharide vaccine, 23-valent, adult or immunosuppressed patient dosage, for use in  (In-House)           Other Orders:         Administration of pneumococcal vaccine (x1)                 PLAN:          Osteoporosis, other           Prescriptions:       Refill of: Raloxifene 60mg Tablet 1 tab daily  #90 (Ninety) tablet(s) Refills: 3          Hypertension         FOLLOW-UP:.   for Medicare wellness  - Dr. Alicea for Annual Checkup           Prescriptions:       Refill of:  Amlodipine  5mg Tablet 1 tab daily  #90 (Ninety) tablet(s) Refills: 1       Valsartan 160mg Tablet Take 1 tablet(s) by mouth daily  #90 (Ninety) tablet(s) Refills: 1          Vaccination against pneumococcal pneumonia         IMMUNIZATIONS given today: Pneumovax.            Orders:       [id number]  Pneumococcal polysaccharide vaccine, 23-valent, adult or immunosuppressed patient dosage, for use in  (In-House)                     Administration of pneumococcal vaccine (x1)             Patient Recommendations:        For  Hypertension:                     APPOINTMENT INFORMATION:        Monday Tuesday Wednesday Thursday Friday Saturday Sunday            Time:___________________AM  PM   Date:_____________________             CHARGE CAPTURE           **Please note: ICD descriptions below are intended for billing purposes only and may not represent clinical diagnoses**        Primary Diagnosis:         733.09 Osteoporosis, other            M81.8    Other osteoporosis without current pathological fracture              Orders:          [id number]   Office/outpatient visit; established patient, level 4  (In-House)           401.1 Hypertension            I10    Essential (primary) hypertension    V03.82 Vaccination against pneumococcal pneumonia            Z23    Encounter for immunization              Orders:          [id number]   Pneumococcal polysaccharide vaccine, 23-valent, adult or immunosuppressed patient dosage, for use in  (In-House)                                           Administration of pneumococcal vaccine (x1)

## 2021-05-18 NOTE — PROGRESS NOTES
"Jelena Hawkins  1945     Office/Outpatient Visit    Visit Date: Thu, Jun 25, 2020 09:17 am    Provider: Ambika Alicea MD (Assistant: Era Brownlee MA)    Location: [facility]        Electronically signed by Ambika Alicea MD on  06/25/2020 12:36:27 PM                             Subjective:        CC: Phoenix is a 74 year old White female.  Patient presents today for follow up visit (not taking Omeprazole);         HPI: Phoenix is here today to follow up on abdominal pain.  She had a CT A/P done in the course of evaluation of refractory abdominal pain which showed a pancreatic mass as well as gastric wall thickening and rectal wall thickening.  She was referred immediately to Dr. Nikolai Jacobo and EGD/colonoscopy was set up through Dr. Olson.  She had the scopes done yesterday, and biopsies were taken from the stomach wall and rectal lesions.  She remains on sucralfate for now but is no longer taking the PPI.  She does think the sucralfate  is helping.        She feels \"okay.\"  She is having a lot of pain in the back.  She has had some bloating.  Minimal rectal bleeding after the biopsies yesterday.  She did have a PET scan done Monday; we are still awaiting those results.    ROS:     CONSTITUTIONAL:  Negative for fatigue and fever.      EYES:  Negative for blurred vision.      CARDIOVASCULAR:  Negative for chest pain and palpitations.      RESPIRATORY:  Negative for recent cough and dyspnea.      GASTROINTESTINAL:  Positive for abdominal pain, abdominal bloating, constipation and change in stool caliber.   Negative for acid reflux symptoms, dysphagia, diarrhea, heartburn, hematochezia, melena, nausea or vomiting.      MUSCULOSKELETAL:  Negative for arthralgias and myalgias.          Past Medical History / Family History / Social History:         Last Reviewed on 6/25/2020 09:23 AM by Ambika Alicea    Past Medical History:             PREVENTIVE HEALTH MAINTENANCE             BONE " DENSITY: was last done 10/9/2019 with the following abnormality noted-- osteopenia improved; on Evista     COLORECTAL CANCER SCREENING: Up to date (colonoscopy q10y; sigmoidoscopy q5y; Cologuard q3y) was last done 10/15/2014, Results are in chart; colonoscopy with normal results     MAMMOGRAM: Done within last 2 years and results in are chart was last done 10/9/2019 with normal results h/o fibrocystic breast disease does not want anymore         PAST MEDICAL HISTORY         Positive for    Hypertension;     Positive for    Osteoarthritis;         CURRENT MEDICAL PROVIDERS:    Ophthalmologist: Dr. Cedeno    Orthopedist: Dr. 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 High Dose Fluzone 10/1/2016    Td adult 10/20/2018    Prevnar 13 (Pneumococcal PCV 13) 1/27/2016    Fluzone High-Dose pf (>=65 yr) 12/1/2017     Fluzone High-Dose pf (>=65 yr) 10/7/2019    PNEUMOVAX 23 (Pneumococcal PPV23) 1/23/2019        Allergies:     Last Reviewed on 6/25/2020 09:19 AM by Era Brownlee    No Known Allergies.        Current Medications:     Last Reviewed on 6/25/2020 09:19 AM by Era Brownlee    Amlodipine  5 mg oral tablet [1 tab daily]    raloxifene 60 mg oral tablet [1 tab daily]    Valsartan 160 mg oral tablet [Take 1 tablet(s) by mouth daily]    Multivitamin/Mineral Supplement     omeprazole 40 mg oral capsule,delayed release (enteric coated) [1 cap daily]    sucralfate 1 gram oral tablet [take 1 tablet (1 gram) by oral route 2 times per day PRN]        Objective:        Vitals:         Current: 6/25/2020 9:20:38 AM    Ht:  5 ft, 2 in;  Wt: 132.2 lbs;  BMI: 24.2T: 98.1 F (temporal);  BP: 108/58 mm Hg (right arm, sitting);  P: 86 bpm (right arm (BP Cuff), sitting);  sCr: 0.67 mg/dL;  GFR: 66.22        Exams:     PHYSICAL EXAM:     GENERAL:  well developed and nourished; appropriately groomed; in no apparent distress;     EYES: extraocular movements intact; conjunctiva and cornea are normal;     RESPIRATORY: normal respiratory rate and pattern with no distress;     MUSCULOSKELETAL: normal overall tone     NEUROLOGIC: mental status: alert and oriented x 3;     PSYCHIATRIC: appropriate affect and demeanor; normal psychomotor function; normal speech pattern;         Lab/Test Results:         Urine temperature: could not confirm (06/25/2020),     All urine drug screen levels confirmed negative: yes (06/25/2020),     Date and time of last pill: new script/AS (06/25/2020),     Performed by: melani (06/25/2020),     Collection Time: 0945 (06/25/2020),             Assessment:         D49.0   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       Z79.899   Other long term (current) drug therapy       K29.00   Acute gastritis without bleeding           ORDERS:         Meds Prescribed:       [New Rx] traMADol 50 mg oral tablet [take 1 tablet  (50 mg) by oral route every 8 hours as needed], #60 (sixty) tablets, Refills: 0 (zero)         Lab Orders:       [id number]  Drug test prsmv qual dir optical obs per day  (In-House)                      Plan:         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Tootsie has lesions present in the colon, stomach and pancreas, concerning for colon primary malignancy with metastasis vs pancreatic malignancy with metastasis.  She has had EGD/colonoscopy done yesterday by Dr. Olson and we are awaiting pathology.  PET scan was done Monday by Lluvia Jacobo/Tanja and we are awaiting those results as well.  In the meantime, she continues to have a lot of abdominal pain that keeps her up at night.  Will try starting her on tramadol to try to avoid constipating effects of a full opioid agonist, although I did tell her that we can certainly consider something more powerful like hydrocodone if the tramadol does not control her pain.  Will keep in touch with her specialists at this point to await further recommendations.          Prescriptions:       [New Rx] traMADol 50 mg oral tablet [take 1 tablet (50 mg) by oral route every 8 hours as needed], #60 (sixty) tablets, Refills: 0 (zero)         Other long term (current) drug therapy    Controlled substance documentation: Nando reviewed; drug screen performed and appropriate; consent is reviewed and signed and on the chart.  She is aware of risk of addiction on this medication, understands that she will need to follow up for a review every 3 months and her medications will be adjusted or decreased as deemed appropriate at each visit.  No history of drug or alcohol abuse.  No concerns about diversion or abuse. She denies side effects related to the medication.  She is aware that she may be called in for pill counts.  The dosing of this medication will be reviewed on a regular basis and reduced if possible..  Ongoing use of a controlled substance is necessary for this patient to  have a normal quality of life     Drug screen Controlled Substance Contract has been ordered           Orders:       [id number]  Drug test prsmv qual dir optical obs per day  (In-House)              Acute gastritis without bleedingSucralfate seems to be helping although she did not feel any benefit from the omeprazole.  She can go up to [facility] as requested.  Call for refills when ready.            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

## 2021-05-18 NOTE — PROGRESS NOTES
Jelena Hawkins 1945     Office/Outpatient Visit    Visit Date: Fri, Feb 23, 2018 10:59 am    Provider: Ambika Alicea MD (Assistant: Hilary Arreguin RN)    Location: [facility], [place]        Electronically signed by Ambika Alicea MD on  02/23/2018 11:18:44 AM                             SUBJECTIVE:        CC:     Phoenix is a 72 year old White female.  Medication Refills (PT DUE FOR PNUEMOVAX); HTN, osteoporosis         HPI: Phoenix is here today to follow up on chronic issues.  She has no acute complaints today.        She is on losartan and amlodipine for HTN.  BP has been well controlled.  No CP, palpitations, SOB.        She is on raloxifene for osteoporosis.  Last DEXA was done 4/2016 so she will be for a repeat this April.     ROS:     CONSTITUTIONAL:  Negative for fatigue and fever.      EYES:  Negative for blurred vision.      E/N/T:  Negative for diminished hearing and nasal congestion.      CARDIOVASCULAR:  Negative for chest pain and palpitations.      RESPIRATORY:  Negative for recent cough and dyspnea.      GASTROINTESTINAL:  Negative for abdominal pain, constipation, diarrhea, nausea and vomiting.      MUSCULOSKELETAL:  Negative for arthralgias, back pain and myalgias.          PMH/FMH/SH:     Last Reviewed on 2/23/2018 11:09 AM by Ambika Alicea    Past Medical History:             PREVENTIVE HEALTH MAINTENANCE             BONE DENSITY: was last done 4/2016 with the following abnormality noted-- osteopenia     COLONOSCOPY: with normal results     INFLUENZA VACCINE: was last done DECLINES     MAMMOGRAM: was last done 5/5/17 fibrocystic breast disease does not want anymore     Prevnar 13: was last done 1/2016     PNEUMOCOCCAL 23 VACCINE: was last done 2010         PAST MEDICAL HISTORY         Positive for    Hypertension;     Positive for    Osteoarthritis;         CURRENT MEDICAL PROVIDERS:    Ophthalmologist: Dr. Cedeno    Orthopedist: Dr. Blount         Surgical History:          Cataract Removal: bilateral;     Hysterectomy: remotely; d/t fibroid;     Joint Replacement: partial L knee - 8/2014;      ORIF L humerus repair;         Family History:         Sister(s): Breast Cancer     Maternal Grandmother: Type 2 Diabetes         Social History:     Occupation:    Retired     Marital Status: associate (former Sister of Sondra)         Tobacco/Alcohol/Supplements:     Last Reviewed on 2/23/2018 11:09 AM by Ambika Alicea    Tobacco: She has never smoked.          Substance Abuse History:     Last Reviewed on 2/23/2018 11:09 AM by Ambika Alicea        Mental Health History:     Last Reviewed on 2/23/2018 11:09 AM by Ambika Alicea        Communicable Diseases (eg STDs):     Last Reviewed on 2/23/2018 11:09 AM by Ambika Alicea            Current Problems:     Last Reviewed on 4/07/2017 12:19 PM by Ambika Alicea    Lower back pain     Osteoporosis, other     Hypertension     Shoulder crepitus         Immunizations:     Fluzone vs. High Dose Fluzone 10/1/2016     Prevnar 13 (Pneumococcal PCV 13) 1/27/2016         Allergies:     Last Reviewed on 2/23/2018 11:01 AM by Hilary Arreguin      No Known Drug Allergies.         Current Medications:     Last Reviewed on 2/23/2018 11:02 AM by Hilary Arreguin    Amlodipine  5mg Tablet 1 tab daily     Losartan 100mg Tablet Take 1 tablet(s) by mouth daily     Raloxifene 60mg Tablet 1 tab daily     Loratadine 10mg Tablet 1 tab daily         OBJECTIVE:        Vitals:         Current: 2/23/2018 11:01:22 AM    Ht:  5 ft, 2 in;  Wt: 154 lbs;  BMI: 28.2    T: 98.5 F (oral);  BP: 139/84 mm Hg (left arm, sitting);  P: 104 bpm (left arm (BP Cuff), sitting);  sCr: 0.65 mg/dL;  GFR: 75.02        Exams:     PHYSICAL EXAM:     GENERAL: vital signs recorded - well developed, well nourished;  well groomed;  no apparent distress;     EYES: extraocular movements intact; conjunctiva and cornea are normal; PERRLA;     E/N/T: OROPHARYNX:  normal mucosa, dentition,  gingiva, and posterior pharynx;     RESPIRATORY: normal respiratory rate and pattern with no distress; normal breath sounds with no rales, rhonchi, wheezes or rubs;     CARDIOVASCULAR: normal rate; rhythm is regular;  no systolic murmur; no edema;     GASTROINTESTINAL: nontender; normal bowel sounds;     MUSCULOSKELETAL: normal gait; normal overall tone         ASSESSMENT           401.1   I10  Hypertension              DDx:     733.09   M81.8  Osteoporosis, other              DDx:         ORDERS:         Meds Prescribed:       Refill of: Amlodipine  5mg Tablet 1 tab daily  #90 (Ninety) tablet(s) Refills: 3       Refill of: Losartan 100mg Tablet Take 1 tablet(s) by mouth daily  #90 (Ninety) tablet(s) Refills: 3       Refill of: Raloxifene 60mg Tablet 1 tab daily  #90 (Ninety) tablet(s) Refills: 3         Radiology/Test Orders:       [id number]  DXA, bone density study, 1 or more sites; axial skeleton (eg hips, pelvis, spine)  (Send-Out)           Lab Orders:       [id number]  [facility] CMP AND LIPID: [id number], [id number]  (Send-Out)                   PLAN:          Hypertension BP at goal.  Refills sent.  Checking labs.  RTC 1 year.     LABORATORY:  Labs ordered to be performed today include HTN/Lipid Panel: CMP, Lipid.            Prescriptions:       Refill of: Amlodipine  5mg Tablet 1 tab daily  #90 (Ninety) tablet(s) Refills: 3       Refill of: Losartan 100mg Tablet Take 1 tablet(s) by mouth daily  #90 (Ninety) tablet(s) Refills: 3           Orders:       [id number]  [facility] CMP AND LIPID: [id number], [id number]  (Send-Out)             Patient Education Handouts:       [facility] [place] Medication Compliance           Osteoporosis, other Refills sent.  Order for DEXA to be done in April.         RADIOLOGY:  I have ordered Dexa Scan to be done today.            Prescriptions:       Refill of: Raloxifene 60mg Tablet 1 tab daily  #90 (Ninety) tablet(s) Refills: 3           Orders:       [id number]  DXA, bone density study, 1 or more sites; axial skeleton (eg  hips, pelvis, spine)  (Send-Out)               CHARGE CAPTURE           **Please note: ICD descriptions below are intended for billing purposes only and may not represent clinical diagnoses**        Primary Diagnosis:         401.1 Hypertension            I10    Essential (primary) hypertension              Orders:          [id number]   Office/outpatient visit; established patient, level 4  (In-House)           733.09 Osteoporosis, other            M81.8    Other osteoporosis without current pathological fracture

## 2021-05-18 NOTE — PROGRESS NOTES
Jelena Hawkins 1945     Office/Outpatient Visit    Visit Date: Tue, Apr 3, 2018 03:25 pm    Provider: Ambika Alicea MD (Assistant: Edith Galaviz MA)    Location: [facility]        Electronically signed by Ambika Alicea MD on  04/03/2018 04:02:17 PM                             SUBJECTIVE:        CC:     Phoenix is a 72 year old White female.  This is a follow-up visit.  elevated liver enzymes         HPI: Phoenix is here today for f/u on elevated liver labs back in Feb.  ALT 84/.  Labs otherwise normal.  She denies excessive NSAID.   She does drink a lot of alcohol.  She can drink 300 mL vodka over an evening.  However, ever since she got the lab results back, she has cut down gradually.  She is now down to a glass of wine a day.  No abd pain, N/V.  Occasionally has diarrhea or constipation.  No stool color changes.  No blood in the stool.     ROS:     CONSTITUTIONAL:  Negative for fatigue and fever.      EYES:  Negative for blurred vision.      E/N/T:  Negative for diminished hearing and nasal congestion.      CARDIOVASCULAR:  Negative for chest pain and palpitations.      RESPIRATORY:  Negative for recent cough and dyspnea.      GASTROINTESTINAL:  Negative for abdominal pain, constipation, diarrhea, nausea and vomiting.      MUSCULOSKELETAL:  Negative for arthralgias, back pain and myalgias.          PMH/FMH/SH:     Last Reviewed on 4/03/2018 03:51 PM by Ambika Alicea    Past Medical History:             PREVENTIVE HEALTH MAINTENANCE             BONE DENSITY: was last done 4/2016 with the following abnormality noted-- osteopenia     COLONOSCOPY: with normal results     INFLUENZA VACCINE: was last done DECLINES     MAMMOGRAM: was last done 5/5/17 fibrocystic breast disease does not want anymore     Prevnar 13: was last done 1/2016     PNEUMOCOCCAL 23 VACCINE: was last done 2010         PAST MEDICAL HISTORY         Positive for    Hypertension;     Positive for    Osteoarthritis;          CURRENT MEDICAL PROVIDERS:    Ophthalmologist: Dr. Cedeno    Orthopedist: Dr. Blount         Surgical History:         Cataract Removal: bilateral;     Hysterectomy: remotely; d/t fibroid;     Joint Replacement: partial L knee - 8/2014;      ORIF L humerus repair;         Family History:         Sister(s): Breast Cancer     Maternal Grandmother: Type 2 Diabetes         Social History:     Occupation:    Retired     Marital Status: associate (former Sister of Sondra)         Tobacco/Alcohol/Supplements:     Last Reviewed on 4/03/2018 03:51 PM by Ambika Alicea    Tobacco: She has never smoked.          Substance Abuse History:     Last Reviewed on 4/03/2018 03:51 PM by Ambika Alicea        Mental Health History:     Last Reviewed on 4/03/2018 03:51 PM by Ambika Alicea        Communicable Diseases (eg STDs):     Last Reviewed on 4/03/2018 03:51 PM by Ambika Alicea            Current Problems:     Last Reviewed on 2/23/2018 11:09 AM by Ambika Alicea    Lower back pain     Osteoporosis, other     Hypertension     Shoulder crepitus         Immunizations:     Fluzone vs. High Dose Fluzone 10/1/2016     Prevnar 13 (Pneumococcal PCV 13) 1/27/2016     Fluzone High-Dose pf (>=65 yr) 12/1/2017         Allergies:     Last Reviewed on 4/03/2018 03:28 PM by Edith Galaviz      No Known Drug Allergies.         Current Medications:     Last Reviewed on 4/03/2018 03:28 PM by Edith Galaviz    Amlodipine  5mg Tablet 1 tab daily     Losartan 100mg Tablet Take 1 tablet(s) by mouth daily     Raloxifene 60mg Tablet 1 tab daily     Loratadine 10mg Tablet 1 tab daily         OBJECTIVE:        Vitals:         Current: 4/3/2018 3:27:39 PM    Ht:  5 ft, 2 in;  Wt: 154.3 lbs;  BMI: 28.2    T: 98.4 F (oral);  BP: 119/77 mm Hg (right arm, sitting);  P: 102 bpm (right arm (BP Cuff), sitting);  sCr: 0.82 mg/dL;  GFR: 59.52        Exams:     PHYSICAL EXAM:     GENERAL: vital signs recorded - well developed, well nourished;   well groomed;  no apparent distress;     EYES: extraocular movements intact; conjunctiva and cornea are normal; PERRLA;     E/N/T: OROPHARYNX:  normal mucosa, dentition, gingiva, and posterior pharynx;     RESPIRATORY: normal respiratory rate and pattern with no distress; normal breath sounds with no rales, rhonchi, wheezes or rubs;     CARDIOVASCULAR: normal rate; rhythm is regular;  no systolic murmur; no edema;     GASTROINTESTINAL: nontender; normal bowel sounds;     MUSCULOSKELETAL: normal gait; normal overall tone         ASSESSMENT           790.4   R74.0  Elevated transaminases              DDx:         ORDERS:         Lab Orders:       [id number]  [facility] Hepatic Function Panel  (Send-Out)                   PLAN:          Elevated transaminases Phoenix endorses excessive EtOH consumption but ever since the labs came back, she has cut back considerably and is now drinking around one glass of wine nightly.  Rechecking hepatic function panel today.  Depending on results, may consider U/S for further evaluation to look for structural changes in the liver.  Discussed importance of limiting alcohol consumption.  She does not feel that it will be difficult for her to do this.  Will continue to monitor.     LABORATORY:  Labs ordered to be performed today include Hepatic function panel.            Orders:       [id number]  [facility] Hepatic Function Panel  (Send-Out)             Patient Education Handouts:       [facility] [place] Medication Compliance              CHARGE CAPTURE           **Please note: ICD descriptions below are intended for billing purposes only and may not represent clinical diagnoses**        Primary Diagnosis:         790.4 Elevated transaminases            R74.0    Nonspecific elevation of levels of transaminase and lactic acid dehydrogenase [LDH]              Orders:          [id number]   Office/outpatient visit; established patient, level 3  (In-House)

## 2021-07-01 VITALS
DIASTOLIC BLOOD PRESSURE: 59 MMHG | SYSTOLIC BLOOD PRESSURE: 112 MMHG | HEIGHT: 62 IN | TEMPERATURE: 98.2 F | HEART RATE: 91 BPM | BODY MASS INDEX: 28.05 KG/M2 | WEIGHT: 152.4 LBS

## 2021-07-01 VITALS
HEIGHT: 62 IN | WEIGHT: 158.2 LBS | TEMPERATURE: 98.3 F | HEART RATE: 86 BPM | SYSTOLIC BLOOD PRESSURE: 152 MMHG | DIASTOLIC BLOOD PRESSURE: 76 MMHG | BODY MASS INDEX: 29.11 KG/M2

## 2021-07-01 VITALS
TEMPERATURE: 98.5 F | HEART RATE: 74 BPM | SYSTOLIC BLOOD PRESSURE: 118 MMHG | DIASTOLIC BLOOD PRESSURE: 81 MMHG | WEIGHT: 152.2 LBS | HEIGHT: 62 IN | BODY MASS INDEX: 28.01 KG/M2

## 2021-07-01 VITALS
SYSTOLIC BLOOD PRESSURE: 139 MMHG | BODY MASS INDEX: 28.34 KG/M2 | HEART RATE: 104 BPM | TEMPERATURE: 98.5 F | DIASTOLIC BLOOD PRESSURE: 84 MMHG | WEIGHT: 154 LBS | HEIGHT: 62 IN

## 2021-07-01 VITALS
TEMPERATURE: 98.6 F | BODY MASS INDEX: 29.88 KG/M2 | HEART RATE: 74 BPM | DIASTOLIC BLOOD PRESSURE: 67 MMHG | SYSTOLIC BLOOD PRESSURE: 135 MMHG | WEIGHT: 162.4 LBS | HEIGHT: 62 IN

## 2021-07-01 VITALS
DIASTOLIC BLOOD PRESSURE: 64 MMHG | SYSTOLIC BLOOD PRESSURE: 126 MMHG | BODY MASS INDEX: 29.85 KG/M2 | HEART RATE: 74 BPM | HEIGHT: 62 IN | TEMPERATURE: 98.1 F | WEIGHT: 162.2 LBS

## 2021-07-01 VITALS
SYSTOLIC BLOOD PRESSURE: 119 MMHG | TEMPERATURE: 98.4 F | DIASTOLIC BLOOD PRESSURE: 77 MMHG | BODY MASS INDEX: 28.39 KG/M2 | WEIGHT: 154.3 LBS | HEIGHT: 62 IN | HEART RATE: 102 BPM

## 2021-07-01 VITALS
BODY MASS INDEX: 29.88 KG/M2 | HEART RATE: 76 BPM | WEIGHT: 162.4 LBS | SYSTOLIC BLOOD PRESSURE: 132 MMHG | TEMPERATURE: 98.4 F | DIASTOLIC BLOOD PRESSURE: 73 MMHG | HEIGHT: 62 IN

## 2021-07-02 VITALS
DIASTOLIC BLOOD PRESSURE: 58 MMHG | BODY MASS INDEX: 24.33 KG/M2 | HEIGHT: 62 IN | SYSTOLIC BLOOD PRESSURE: 108 MMHG | TEMPERATURE: 98.1 F | WEIGHT: 132.2 LBS | HEART RATE: 86 BPM

## 2021-07-02 VITALS
HEIGHT: 62 IN | SYSTOLIC BLOOD PRESSURE: 124 MMHG | BODY MASS INDEX: 25.1 KG/M2 | TEMPERATURE: 97.8 F | HEART RATE: 94 BPM | DIASTOLIC BLOOD PRESSURE: 71 MMHG | WEIGHT: 136.4 LBS

## 2021-07-02 VITALS
TEMPERATURE: 97.8 F | HEIGHT: 62 IN | BODY MASS INDEX: 25.1 KG/M2 | HEART RATE: 92 BPM | DIASTOLIC BLOOD PRESSURE: 71 MMHG | WEIGHT: 136.4 LBS | SYSTOLIC BLOOD PRESSURE: 111 MMHG

## 2022-09-19 NOTE — PROGRESS NOTES
Jelena Hawkins 1945     Office/Outpatient Visit    Visit Date: Mon, Oct 7, 2019 02:13 pm    Provider: Ambika Alicea MD (Assistant: Abby Balderrama MA)    Location: [facility]        Electronically signed by Ambika Alicea MD on  10/08/2019 03:41:12 PM                             SUBJECTIVE:        CC:     Phoenix is a 73 year old White female.  This is a follow-up visit.  needs mammogram;         HPI: Phoenix is here today for routine f/u on chronic issues.        She is on amlodipine and valsartan for HTN.  BP has been well controlled.  No CP, palpitations, SOB.        She is on raloxifene for osteoporosis.  Due for DEXA; scheduled for Wed.        She has been having pain in the dorsal.  She was seen here and had foot XR done that showed no fracture.  Pain is intermittent.  Walking seems to loosen it up and makes it feel better.  Worst when she wakes up and stand up over night.  She has been trying arthritis cream and other OTC preparations.          She is due for mammogram; scheduled for Wed.  She is UTD on colonoscopy reportedly, last done at [facility].        She has had a couple of falls in the past year.  Tripped on the sidewalk at [place].     ROS:     CONSTITUTIONAL:  Negative for chills, fatigue and fever.      E/N/T:  Negative for diminished hearing and nasal congestion.      CARDIOVASCULAR:  Negative for chest pain and pedal edema.      RESPIRATORY:  Negative for recent cough and dyspnea.      GASTROINTESTINAL:  Negative for abdominal pain, constipation, diarrhea, nausea and vomiting.      MUSCULOSKELETAL:  Positive for back pain ( sciatica; doing better now after deep massage ) and R dorsal foot pain.   Negative for arthralgias or myalgias.      INTEGUMENTARY/BREAST:  Negative for breast mass, skin changes of breast, breast tenderness and nipple discharge.          PMH/FMH/SH:     Last Reviewed on 9/07/2018 11:23 AM by Kiya Oliva    Past Medical History:             PREVENTIVE  Order resent. If not received in 2 weeks let us know. The last order was placed in May. Should have been received in 2 weeks of order being placed.   Robert Pollock MD HEALTH MAINTENANCE             BONE DENSITY: was last done 4/2016 with the following abnormality noted-- osteopenia     COLORECTAL CANCER SCREENING: colonoscopy with normal results     MAMMOGRAM: was last done 5/5/17 fibrocystic breast disease does not want anymore         PAST MEDICAL HISTORY         Positive for    Hypertension;     Positive for    Osteoarthritis;         CURRENT MEDICAL PROVIDERS:    Ophthalmologist: Dr. Cedeno    Orthopedist: Dr. 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          Substance Abuse History:     Last Reviewed on 4/03/2018 03:51 PM by Ambika Alicea        Mental Health History:     Last Reviewed on 4/03/2018 03:51 PM by Ambika Alicea        Communicable Diseases (eg STDs):     Last Reviewed on 4/03/2018 03:51 PM by Ambika Alicea            Current Problems:     Last Reviewed on 10/07/2019 02:35 PM by Ambika Alicea    Lower back pain     Osteoporosis, other     Hypertension     Foot pain     Shoulder crepitus         Immunizations:     Fluzone vs. High Dose Fluzone 10/1/2016     Td adult 10/20/2018     Prevnar 13 (Pneumococcal PCV 13) 1/27/2016     Fluzone High-Dose pf (>=65 yr) 12/1/2017     PNEUMOVAX 23 (Pneumococcal PPV23) 1/23/2019         Allergies:     Last Reviewed on 8/26/2019 01:28 PM by Spurling, Sarah C      No Known Drug Allergies.         Current Medications:     Last Reviewed on 8/26/2019 01:29 PM by Spurling, Sarah C    Amlodipine  5mg Tablet 1 tab daily     Valsartan 160mg Tablet  Take 1 tablet(s) by mouth daily     Raloxifene 60mg Tablet 1 tab daily         OBJECTIVE:        Vitals:         Current: 10/7/2019 2:18:21 PM    Ht:  5 ft, 2 in;  Wt: 162.4 lbs;  BMI: 29.7    T: 98.6 F (oral);  BP: 135/67 mm Hg (right arm, sitting);  P: 74 bpm (right arm (BP Cuff), sitting);  sCr: 0.82 mg/dL;  GFR: 59.94        Exams:     PHYSICAL EXAM:     GENERAL: vital signs recorded - well developed, well nourished;  well groomed;  no apparent distress;     EYES: extraocular movements intact; conjunctiva and cornea are normal; PERRLA;     E/N/T: OROPHARYNX:  normal mucosa, dentition, gingiva, and posterior pharynx;     RESPIRATORY: normal respiratory rate and pattern with no distress; normal breath sounds with no rales, rhonchi, wheezes or rubs;     CARDIOVASCULAR: normal rate; rhythm is regular;  no systolic murmur; no edema;     GASTROINTESTINAL: nontender; normal bowel sounds;     LYMPHATIC: no axillary adenopathy;     BREAST/INTEGUMENT: breast exam: no overlying skin changes; no breast masses;     MUSCULOSKELETAL: normal gait; normal overall tone         Procedures:     Vaccination against other viral diseases, Influenza     1. Influenza high dose 0.5 ml unit dose, AS, ABN signed given IM in the right upper arm; administered by AS;  lot number [id number]; expires 05/16/2020 Regarding contraindications to an Influenza vaccine: Denies moderate/severe illness with/without fever; serious reaction to eggs, egg proteins, gentamicin, gelatin, arginine, neomycin or polymixin; serious reaction after recieving previous influenza vaccines; and history of Guillain-[place] Syndrome.              ASSESSMENT           401.1   I10  Hypertension              DDx:     733.09   M81.8  Osteoporosis, other              DDx:     V76.12   Z12.31  Screening mammogram - other              DDx:     729.5   M79.671  Foot pain              DDx:     V04.81   Z23  Vaccination against other viral diseases, Influenza              DDx:          ORDERS:         Meds Prescribed:       Refill of: Amlodipine  5mg Tablet 1 tab daily  #90 (Ninety) tablet(s) Refills: 3       Refill of: Valsartan 160mg Tablet Take 1 tablet(s) by mouth daily  #90 (Ninety) tablet(s) Refills: 3         Radiology/Test Orders:       3017F  Colorectal CA screen results documented and reviewed (PV)  (In-House)           Lab Orders:       APPTO  Appointment need  (In-House)         55055  HTN - [facility] CMP AND LIPID: [id number]  (Send-Out)           Procedures Ordered:       00500  Fluzone High Dose  (In-House)           Other Orders:       1100F  Pt screen for fall risk; document 2+ falls in the past yr or any fall w/injury in past year (ISABEL)  (In-House)           Depression screen negative  (In-House)           Administration of influenza virus vaccine (x1)                 PLAN:          Hypertension BP at goal.  Refills sent.  Checking labs.  RTC 1 yr for AWV.     LABORATORY:  Labs ordered to be performed today include HTN/Lipid Panel: CMP, Lipid.  [facility] PHQ-9 Depression Screening: Completed form scanned and in chart; Total Score 6; Positive Depression Screen but after further evaluation the patient does not have a diagnosis of depression.      FOLLOW-UP: in 1 year:.  Medicare wellness 30 min with Nixon           Prescriptions:       Refill of: Amlodipine  5mg Tablet 1 tab daily  #90 (Ninety) tablet(s) Refills: 3       Refill of: Valsartan 160mg Tablet Take 1 tablet(s) by mouth daily  #90 (Ninety) tablet(s) Refills: 3           Orders:       APPTO  Appointment need  (In-House)         1100F  Pt screen for fall risk; document 2+ falls in the past yr or any fall w/injury in past year (ISABEL)  (In-House)           Depression screen negative  (In-House)         40325  HTN - [facility] CMP AND LIPID: [id number]  (Send-Out)         3017F  Colorectal CA screen results documented and reviewed (PV)  (In-House)            Osteoporosis, other DEXA scheduled for Wed.  No refills needed  today.          Screening mammogram - other Scheduled for Wed.          Foot pain R dorsal foot pain.  She has already been trying OTC analgesics and salves.  First started hurting in July after a camping trip.  She was seen here and got a foot XR that was negative.  Exam seems pretty benign today with no tenderness.  Try icing the foot with NSAIDs and relative rest for the next week. If she is still having problems at the end of that time, let me know and we will get her in with Dr. Jin for further evaluation (please include foot XR results, dx R foot pain).          Vaccination against other viral diseases, Influenza         IMMUNIZATIONS given today: Influenza HIGH Dose.            Orders:       53221  Fluzone High Dose  (In-House)                     Administration of influenza virus vaccine (x1)             Patient Recommendations:        For  Hypertension:                     APPOINTMENT INFORMATION:        Monday Tuesday Wednesday Thursday Friday Saturday Sunday            Time:___________________AM  PM   Date:_____________________             CHARGE CAPTURE           **Please note: ICD descriptions below are intended for billing purposes only and may not represent clinical diagnoses**        Primary Diagnosis:         401.1 Hypertension            I10    Essential (primary) hypertension              Orders:          09553   Office/outpatient visit; established patient, level 4  (In-House)             APPTO   Appointment need  (In-House)             1100F   Pt screen for fall risk; document 2+ falls in the past yr or any fall w/injury in past year (ISABEL)  (In-House)                Depression screen negative  (In-House)             3017F   Colorectal CA screen results documented and reviewed (PV)  (In-House)           733.09 Osteoporosis, other            M81.8    Other osteoporosis without current pathological fracture    V76.12 Screening mammogram - other            Z12.31    Encounter  for screening mammogram for malignant neoplasm of breast    729.5 Foot pain            M79.671    Pain in right foot    V04.81 Vaccination against other viral diseases, Influenza            Z23    Encounter for immunization              Orders:          11616   Fluzone High Dose  (In-House)                                           Administration of influenza virus vaccine (x1)